# Patient Record
Sex: FEMALE | Race: BLACK OR AFRICAN AMERICAN | Employment: FULL TIME | URBAN - METROPOLITAN AREA
[De-identification: names, ages, dates, MRNs, and addresses within clinical notes are randomized per-mention and may not be internally consistent; named-entity substitution may affect disease eponyms.]

---

## 2024-09-24 ENCOUNTER — OFFICE VISIT (OUTPATIENT)
Age: 44
End: 2024-09-24
Payer: COMMERCIAL

## 2024-09-24 VITALS
OXYGEN SATURATION: 98 % | WEIGHT: 221 LBS | SYSTOLIC BLOOD PRESSURE: 123 MMHG | HEIGHT: 63 IN | RESPIRATION RATE: 18 BRPM | HEART RATE: 77 BPM | BODY MASS INDEX: 39.16 KG/M2 | DIASTOLIC BLOOD PRESSURE: 70 MMHG

## 2024-09-24 DIAGNOSIS — Z79.01 LONG TERM (CURRENT) USE OF ANTICOAGULANTS: Primary | ICD-10-CM

## 2024-09-24 DIAGNOSIS — F43.9 SITUATIONAL STRESS: ICD-10-CM

## 2024-09-24 DIAGNOSIS — F41.9 ANXIETY: ICD-10-CM

## 2024-09-24 DIAGNOSIS — R55 SYNCOPE, UNSPECIFIED SYNCOPE TYPE: ICD-10-CM

## 2024-09-24 LAB
POC INR: 2.1
PROTHROMBIN TIME, POC: 25.6

## 2024-09-24 PROCEDURE — 3078F DIAST BP <80 MM HG: CPT | Performed by: NURSE PRACTITIONER

## 2024-09-24 PROCEDURE — 3074F SYST BP LT 130 MM HG: CPT | Performed by: NURSE PRACTITIONER

## 2024-09-24 PROCEDURE — 85610 PROTHROMBIN TIME: CPT | Performed by: NURSE PRACTITIONER

## 2024-09-24 PROCEDURE — 99214 OFFICE O/P EST MOD 30 MIN: CPT | Performed by: NURSE PRACTITIONER

## 2024-09-24 ASSESSMENT — PATIENT HEALTH QUESTIONNAIRE - PHQ9
9. THOUGHTS THAT YOU WOULD BE BETTER OFF DEAD, OR OF HURTING YOURSELF: NOT AT ALL
5. POOR APPETITE OR OVEREATING: SEVERAL DAYS
2. FEELING DOWN, DEPRESSED OR HOPELESS: NEARLY EVERY DAY
10. IF YOU CHECKED OFF ANY PROBLEMS, HOW DIFFICULT HAVE THESE PROBLEMS MADE IT FOR YOU TO DO YOUR WORK, TAKE CARE OF THINGS AT HOME, OR GET ALONG WITH OTHER PEOPLE: EXTREMELY DIFFICULT
SUM OF ALL RESPONSES TO PHQ QUESTIONS 1-9: 17
6. FEELING BAD ABOUT YOURSELF - OR THAT YOU ARE A FAILURE OR HAVE LET YOURSELF OR YOUR FAMILY DOWN: MORE THAN HALF THE DAYS
7. TROUBLE CONCENTRATING ON THINGS, SUCH AS READING THE NEWSPAPER OR WATCHING TELEVISION: MORE THAN HALF THE DAYS
1. LITTLE INTEREST OR PLEASURE IN DOING THINGS: NEARLY EVERY DAY
SUM OF ALL RESPONSES TO PHQ QUESTIONS 1-9: 17
SUM OF ALL RESPONSES TO PHQ9 QUESTIONS 1 & 2: 6
SUM OF ALL RESPONSES TO PHQ QUESTIONS 1-9: 17
SUM OF ALL RESPONSES TO PHQ QUESTIONS 1-9: 17
4. FEELING TIRED OR HAVING LITTLE ENERGY: NEARLY EVERY DAY
3. TROUBLE FALLING OR STAYING ASLEEP: NEARLY EVERY DAY
8. MOVING OR SPEAKING SO SLOWLY THAT OTHER PEOPLE COULD HAVE NOTICED. OR THE OPPOSITE, BEING SO FIGETY OR RESTLESS THAT YOU HAVE BEEN MOVING AROUND A LOT MORE THAN USUAL: NOT AT ALL

## 2024-09-24 ASSESSMENT — ENCOUNTER SYMPTOMS
GASTROINTESTINAL NEGATIVE: 1
RESPIRATORY NEGATIVE: 1

## 2024-09-25 ENCOUNTER — OFFICE VISIT (OUTPATIENT)
Age: 44
End: 2024-09-25
Payer: COMMERCIAL

## 2024-09-25 VITALS
WEIGHT: 225 LBS | BODY MASS INDEX: 39.87 KG/M2 | HEIGHT: 63 IN | OXYGEN SATURATION: 98 % | RESPIRATION RATE: 20 BRPM | SYSTOLIC BLOOD PRESSURE: 130 MMHG | TEMPERATURE: 98.3 F | DIASTOLIC BLOOD PRESSURE: 86 MMHG | HEART RATE: 94 BPM

## 2024-09-25 DIAGNOSIS — F41.9 ANXIETY: ICD-10-CM

## 2024-09-25 DIAGNOSIS — R07.89 CHEST DISCOMFORT: ICD-10-CM

## 2024-09-25 DIAGNOSIS — R55 SYNCOPE, UNSPECIFIED SYNCOPE TYPE: Primary | ICD-10-CM

## 2024-09-25 PROCEDURE — 99204 OFFICE O/P NEW MOD 45 MIN: CPT | Performed by: INTERNAL MEDICINE

## 2024-09-25 PROCEDURE — 3075F SYST BP GE 130 - 139MM HG: CPT | Performed by: INTERNAL MEDICINE

## 2024-09-25 PROCEDURE — 3079F DIAST BP 80-89 MM HG: CPT | Performed by: INTERNAL MEDICINE

## 2024-09-26 ENCOUNTER — APPOINTMENT (OUTPATIENT)
Facility: HOSPITAL | Age: 44
End: 2024-09-26
Payer: COMMERCIAL

## 2024-09-26 ENCOUNTER — HOSPITAL ENCOUNTER (EMERGENCY)
Facility: HOSPITAL | Age: 44
Discharge: HOME | End: 2024-09-26
Attending: EMERGENCY MEDICINE
Payer: COMMERCIAL

## 2024-09-26 VITALS
HEART RATE: 67 BPM | TEMPERATURE: 99.2 F | DIASTOLIC BLOOD PRESSURE: 82 MMHG | HEIGHT: 63 IN | OXYGEN SATURATION: 100 % | SYSTOLIC BLOOD PRESSURE: 144 MMHG | RESPIRATION RATE: 15 BRPM | BODY MASS INDEX: 39.16 KG/M2 | WEIGHT: 221 LBS

## 2024-09-26 DIAGNOSIS — R07.9 CHEST PAIN, UNSPECIFIED TYPE: Primary | ICD-10-CM

## 2024-09-26 DIAGNOSIS — R51.9 NONINTRACTABLE HEADACHE, UNSPECIFIED CHRONICITY PATTERN, UNSPECIFIED HEADACHE TYPE: ICD-10-CM

## 2024-09-26 LAB
ALBUMIN SERPL-MCNC: 3.8 G/DL (ref 3.5–5)
ALBUMIN/GLOB SERPL: 1.1 (ref 1.1–2.2)
ALP SERPL-CCNC: 82 U/L (ref 45–117)
ALT SERPL-CCNC: 54 U/L (ref 12–78)
ANION GAP SERPL CALC-SCNC: 9 MMOL/L (ref 2–12)
AST SERPL-CCNC: 16 U/L (ref 15–37)
BASOPHILS # BLD: 0.1 K/UL (ref 0–0.1)
BASOPHILS NFR BLD: 1 % (ref 0–1)
BILIRUB SERPL-MCNC: 0.3 MG/DL (ref 0.2–1)
BUN SERPL-MCNC: 10 MG/DL (ref 6–20)
BUN/CREAT SERPL: 10 (ref 12–20)
CALCIUM SERPL-MCNC: 9 MG/DL (ref 8.5–10.1)
CHLORIDE SERPL-SCNC: 102 MMOL/L (ref 97–108)
CO2 SERPL-SCNC: 28 MMOL/L (ref 21–32)
CREAT SERPL-MCNC: 1 MG/DL (ref 0.55–1.02)
DIFFERENTIAL METHOD BLD: ABNORMAL
EOSINOPHIL # BLD: 0.3 K/UL (ref 0–0.4)
EOSINOPHIL NFR BLD: 6 % (ref 0–7)
ERYTHROCYTE [DISTWIDTH] IN BLOOD BY AUTOMATED COUNT: 15.1 % (ref 11.5–14.5)
GLOBULIN SER CALC-MCNC: 3.6 G/DL (ref 2–4)
GLUCOSE SERPL-MCNC: 84 MG/DL (ref 65–100)
HCT VFR BLD AUTO: 39.8 % (ref 35–47)
HGB BLD-MCNC: 12.7 G/DL (ref 11.5–16)
IMM GRANULOCYTES # BLD AUTO: 0 K/UL (ref 0–0.04)
IMM GRANULOCYTES NFR BLD AUTO: 0 % (ref 0–0.5)
LYMPHOCYTES # BLD: 2.7 K/UL (ref 0.8–3.5)
LYMPHOCYTES NFR BLD: 47 % (ref 12–49)
MCH RBC QN AUTO: 24.5 PG (ref 26–34)
MCHC RBC AUTO-ENTMCNC: 31.9 G/DL (ref 30–36.5)
MCV RBC AUTO: 76.8 FL (ref 80–99)
MONOCYTES # BLD: 0.5 K/UL (ref 0–1)
MONOCYTES NFR BLD: 8 % (ref 5–13)
NEUTS SEG # BLD: 2.3 K/UL (ref 1.8–8)
NEUTS SEG NFR BLD: 38 % (ref 32–75)
NRBC # BLD: 0 K/UL (ref 0–0.01)
NRBC BLD-RTO: 0 PER 100 WBC
PLATELET # BLD AUTO: 290 K/UL (ref 150–400)
PMV BLD AUTO: 12.5 FL (ref 8.9–12.9)
POTASSIUM SERPL-SCNC: 3.6 MMOL/L (ref 3.5–5.1)
PROT SERPL-MCNC: 7.4 G/DL (ref 6.4–8.2)
RBC # BLD AUTO: 5.18 M/UL (ref 3.8–5.2)
SODIUM SERPL-SCNC: 139 MMOL/L (ref 136–145)
TROPONIN I SERPL HS-MCNC: <4 NG/L (ref 0–51)
WBC # BLD AUTO: 5.9 K/UL (ref 3.6–11)

## 2024-09-26 PROCEDURE — 93005 ELECTROCARDIOGRAM TRACING: CPT | Performed by: EMERGENCY MEDICINE

## 2024-09-26 PROCEDURE — 80053 COMPREHEN METABOLIC PANEL: CPT

## 2024-09-26 PROCEDURE — 99285 EMERGENCY DEPT VISIT HI MDM: CPT

## 2024-09-26 PROCEDURE — 85025 COMPLETE CBC W/AUTO DIFF WBC: CPT

## 2024-09-26 PROCEDURE — 71045 X-RAY EXAM CHEST 1 VIEW: CPT

## 2024-09-26 PROCEDURE — 6360000002 HC RX W HCPCS: Performed by: EMERGENCY MEDICINE

## 2024-09-26 PROCEDURE — 84484 ASSAY OF TROPONIN QUANT: CPT

## 2024-09-26 PROCEDURE — 36415 COLL VENOUS BLD VENIPUNCTURE: CPT

## 2024-09-26 PROCEDURE — 96374 THER/PROPH/DIAG INJ IV PUSH: CPT

## 2024-09-26 RX ORDER — KETOROLAC TROMETHAMINE 15 MG/ML
15 INJECTION, SOLUTION INTRAMUSCULAR; INTRAVENOUS
Status: COMPLETED | OUTPATIENT
Start: 2024-09-26 | End: 2024-09-26

## 2024-09-26 RX ADMIN — KETOROLAC TROMETHAMINE 15 MG: 15 INJECTION, SOLUTION INTRAMUSCULAR; INTRAVENOUS at 20:23

## 2024-09-26 ASSESSMENT — PAIN SCALES - GENERAL
PAINLEVEL_OUTOF10: 7
PAINLEVEL_OUTOF10: 6

## 2024-09-26 ASSESSMENT — LIFESTYLE VARIABLES
HOW OFTEN DO YOU HAVE A DRINK CONTAINING ALCOHOL: MONTHLY OR LESS
HOW MANY STANDARD DRINKS CONTAINING ALCOHOL DO YOU HAVE ON A TYPICAL DAY: 1 OR 2

## 2024-09-26 ASSESSMENT — PAIN DESCRIPTION - DESCRIPTORS: DESCRIPTORS: ACHING

## 2024-09-26 ASSESSMENT — PAIN DESCRIPTION - LOCATION: LOCATION: HEAD

## 2024-09-26 ASSESSMENT — PAIN - FUNCTIONAL ASSESSMENT: PAIN_FUNCTIONAL_ASSESSMENT: 0-10

## 2024-09-27 LAB
EKG ATRIAL RATE: 69 BPM
EKG DIAGNOSIS: NORMAL
EKG P AXIS: 52 DEGREES
EKG P-R INTERVAL: 102 MS
EKG Q-T INTERVAL: 414 MS
EKG QRS DURATION: 78 MS
EKG QTC CALCULATION (BAZETT): 443 MS
EKG R AXIS: 30 DEGREES
EKG T AXIS: 51 DEGREES
EKG VENTRICULAR RATE: 69 BPM

## 2024-09-27 ASSESSMENT — HEART SCORE: ECG: NORMAL

## 2024-10-04 ENCOUNTER — HOSPITAL ENCOUNTER (EMERGENCY)
Facility: HOSPITAL | Age: 44
Discharge: HOME | End: 2024-10-04
Attending: FAMILY MEDICINE | Admitting: FAMILY MEDICINE
Payer: COMMERCIAL

## 2024-10-04 VITALS
DIASTOLIC BLOOD PRESSURE: 82 MMHG | RESPIRATION RATE: 19 BRPM | TEMPERATURE: 99.1 F | SYSTOLIC BLOOD PRESSURE: 125 MMHG | HEIGHT: 63 IN | WEIGHT: 218 LBS | OXYGEN SATURATION: 99 % | BODY MASS INDEX: 38.62 KG/M2 | HEART RATE: 80 BPM

## 2024-10-04 DIAGNOSIS — G43.909 MIGRAINE WITHOUT STATUS MIGRAINOSUS, NOT INTRACTABLE, UNSPECIFIED MIGRAINE TYPE: ICD-10-CM

## 2024-10-04 DIAGNOSIS — R55 SYNCOPE AND COLLAPSE: Primary | ICD-10-CM

## 2024-10-04 LAB
ALBUMIN SERPL-MCNC: 3.7 G/DL (ref 3.5–5)
ALBUMIN/GLOB SERPL: 1 (ref 1.1–2.2)
ALP SERPL-CCNC: 89 U/L (ref 45–117)
ALT SERPL-CCNC: 26 U/L (ref 12–78)
ANION GAP SERPL CALC-SCNC: 6 MMOL/L (ref 2–12)
APPEARANCE UR: CLEAR
AST SERPL-CCNC: 16 U/L (ref 15–37)
BACTERIA URNS QL MICRO: NEGATIVE /HPF
BASOPHILS # BLD: 0.1 K/UL (ref 0–0.1)
BASOPHILS NFR BLD: 1 % (ref 0–1)
BILIRUB SERPL-MCNC: 0.3 MG/DL (ref 0.2–1)
BILIRUB UR QL: NEGATIVE
BUN SERPL-MCNC: 11 MG/DL (ref 6–20)
BUN/CREAT SERPL: 10 (ref 12–20)
CALCIUM SERPL-MCNC: 9.1 MG/DL (ref 8.5–10.1)
CHLORIDE SERPL-SCNC: 103 MMOL/L (ref 97–108)
CO2 SERPL-SCNC: 33 MMOL/L (ref 21–32)
COLOR UR: ABNORMAL
CREAT SERPL-MCNC: 1.08 MG/DL (ref 0.55–1.02)
DIFFERENTIAL METHOD BLD: ABNORMAL
EOSINOPHIL # BLD: 0.6 K/UL (ref 0–0.4)
EOSINOPHIL NFR BLD: 9 % (ref 0–7)
EPITH CASTS URNS QL MICRO: ABNORMAL /LPF
ERYTHROCYTE [DISTWIDTH] IN BLOOD BY AUTOMATED COUNT: 15 % (ref 11.5–14.5)
GLOBULIN SER CALC-MCNC: 3.8 G/DL (ref 2–4)
GLUCOSE SERPL-MCNC: 102 MG/DL (ref 65–100)
GLUCOSE UR STRIP.AUTO-MCNC: NEGATIVE MG/DL
HCT VFR BLD AUTO: 41.2 % (ref 35–47)
HGB BLD-MCNC: 13.3 G/DL (ref 11.5–16)
HGB UR QL STRIP: ABNORMAL
IMM GRANULOCYTES # BLD AUTO: 0 K/UL (ref 0–0.04)
IMM GRANULOCYTES NFR BLD AUTO: 0 % (ref 0–0.5)
INR PPP: 1.8 (ref 0.9–1.1)
KETONES UR QL STRIP.AUTO: NEGATIVE MG/DL
LEUKOCYTE ESTERASE UR QL STRIP.AUTO: NEGATIVE
LYMPHOCYTES # BLD: 2.3 K/UL (ref 0.8–3.5)
LYMPHOCYTES NFR BLD: 34 % (ref 12–49)
MAGNESIUM SERPL-MCNC: 2.1 MG/DL (ref 1.6–2.4)
MCH RBC QN AUTO: 24.8 PG (ref 26–34)
MCHC RBC AUTO-ENTMCNC: 32.3 G/DL (ref 30–36.5)
MCV RBC AUTO: 76.7 FL (ref 80–99)
MONOCYTES # BLD: 0.6 K/UL (ref 0–1)
MONOCYTES NFR BLD: 9 % (ref 5–13)
NEUTS SEG # BLD: 3.2 K/UL (ref 1.8–8)
NEUTS SEG NFR BLD: 47 % (ref 32–75)
NITRITE UR QL STRIP.AUTO: NEGATIVE
NRBC # BLD: 0 K/UL (ref 0–0.01)
NRBC BLD-RTO: 0 PER 100 WBC
PH UR STRIP: 6.5 (ref 5–8)
PLATELET # BLD AUTO: 318 K/UL (ref 150–400)
PMV BLD AUTO: 11.7 FL (ref 8.9–12.9)
POTASSIUM SERPL-SCNC: 3.5 MMOL/L (ref 3.5–5.1)
PROT SERPL-MCNC: 7.5 G/DL (ref 6.4–8.2)
PROT UR STRIP-MCNC: NEGATIVE MG/DL
PROTHROMBIN TIME: 17.1 SEC (ref 9–11.1)
RBC # BLD AUTO: 5.37 M/UL (ref 3.8–5.2)
RBC #/AREA URNS HPF: ABNORMAL /HPF (ref 0–5)
SODIUM SERPL-SCNC: 142 MMOL/L (ref 136–145)
SP GR UR REFRACTOMETRY: 1.01 (ref 1–1.03)
URINE CULTURE IF INDICATED: ABNORMAL
UROBILINOGEN UR QL STRIP.AUTO: 0.2 EU/DL (ref 0.2–1)
WBC # BLD AUTO: 6.8 K/UL (ref 3.6–11)
WBC URNS QL MICRO: ABNORMAL /HPF (ref 0–4)

## 2024-10-04 PROCEDURE — 99284 EMERGENCY DEPT VISIT MOD MDM: CPT

## 2024-10-04 PROCEDURE — 96374 THER/PROPH/DIAG INJ IV PUSH: CPT

## 2024-10-04 PROCEDURE — 83735 ASSAY OF MAGNESIUM: CPT

## 2024-10-04 PROCEDURE — 36415 COLL VENOUS BLD VENIPUNCTURE: CPT

## 2024-10-04 PROCEDURE — 96375 TX/PRO/DX INJ NEW DRUG ADDON: CPT

## 2024-10-04 PROCEDURE — 85025 COMPLETE CBC W/AUTO DIFF WBC: CPT

## 2024-10-04 PROCEDURE — 96361 HYDRATE IV INFUSION ADD-ON: CPT

## 2024-10-04 PROCEDURE — 81001 URINALYSIS AUTO W/SCOPE: CPT

## 2024-10-04 PROCEDURE — 85610 PROTHROMBIN TIME: CPT

## 2024-10-04 PROCEDURE — 6360000002 HC RX W HCPCS: Performed by: FAMILY MEDICINE

## 2024-10-04 PROCEDURE — 2580000003 HC RX 258: Performed by: FAMILY MEDICINE

## 2024-10-04 PROCEDURE — 80053 COMPREHEN METABOLIC PANEL: CPT

## 2024-10-04 RX ORDER — PROCHLORPERAZINE EDISYLATE 5 MG/ML
10 INJECTION INTRAMUSCULAR; INTRAVENOUS ONCE
Status: COMPLETED | OUTPATIENT
Start: 2024-10-04 | End: 2024-10-04

## 2024-10-04 RX ORDER — 0.9 % SODIUM CHLORIDE 0.9 %
1000 INTRAVENOUS SOLUTION INTRAVENOUS ONCE
Status: COMPLETED | OUTPATIENT
Start: 2024-10-04 | End: 2024-10-04

## 2024-10-04 RX ORDER — DIPHENHYDRAMINE HYDROCHLORIDE 50 MG/ML
25 INJECTION INTRAMUSCULAR; INTRAVENOUS
Status: COMPLETED | OUTPATIENT
Start: 2024-10-04 | End: 2024-10-04

## 2024-10-04 RX ORDER — KETOROLAC TROMETHAMINE 15 MG/ML
15 INJECTION, SOLUTION INTRAMUSCULAR; INTRAVENOUS
Status: COMPLETED | OUTPATIENT
Start: 2024-10-04 | End: 2024-10-04

## 2024-10-04 RX ORDER — PROPRANOLOL HCL 60 MG
60 CAPSULE, EXTENDED RELEASE 24HR ORAL DAILY
COMMUNITY
Start: 2024-10-02 | End: 2024-11-01

## 2024-10-04 RX ADMIN — PROCHLORPERAZINE EDISYLATE 10 MG: 5 INJECTION INTRAMUSCULAR; INTRAVENOUS at 21:14

## 2024-10-04 RX ADMIN — KETOROLAC TROMETHAMINE 15 MG: 15 INJECTION, SOLUTION INTRAMUSCULAR; INTRAVENOUS at 21:12

## 2024-10-04 RX ADMIN — SODIUM CHLORIDE 1000 ML: 9 INJECTION, SOLUTION INTRAVENOUS at 21:11

## 2024-10-04 RX ADMIN — DIPHENHYDRAMINE HYDROCHLORIDE 25 MG: 50 INJECTION INTRAMUSCULAR; INTRAVENOUS at 21:14

## 2024-10-04 ASSESSMENT — PAIN SCALES - GENERAL
PAINLEVEL_OUTOF10: 0

## 2024-10-04 ASSESSMENT — PAIN - FUNCTIONAL ASSESSMENT: PAIN_FUNCTIONAL_ASSESSMENT: NONE - DENIES PAIN

## 2024-10-05 NOTE — ED PROVIDER NOTES
[facility] EMERGENCY DEP  EMERGENCY DEPARTMENT ENCOUNTER       Pt Name: Patricia Babb  MRN: [id number]  Birthdate 1980  Date of evaluation: 10/4/2024  Provider: Cici Araiza MD   PCP: Gloria Alex APRN - NP  Note Started: 8:31 PM EDT 10/4/24     CHIEF COMPLAINT       Chief Complaint   Patient presents with    Loss of Consciousness    Headache        HISTORY OF PRESENT ILLNESS: 1 or more elements      History From: Patient and Patient's   HPI Limitations: None     Patricia Babb is a 44 y.o. female who was brought to the ED by her  because of a syncopal episode that occurred about an hour pta. She reports that she was admitted to [facility] 9/29 to 10/2, after being seen in [facility] 9/18/24 with recurrent syncope. This second time she was seen, they told her that she was having syncope from her headaches. They changed her metoprolol to propranolol. She has had an event monitor read by Dr. Thayer 9/25, and no cardiac arrhythmias were seen when she fainted. Tonight, she fainted across her bed, and when she came to she said that she has her usual left sided migraine, throbbing in nature.     Nursing Notes were all reviewed and agreed with or any disagreements were addressed in the HPI.     REVIEW OF SYSTEMS      Review of Systems     Positives and Pertinent negatives as per HPI.    PAST HISTORY     Past Medical History:  Past Medical History:   Diagnosis Date    Asthma     2017 SUMMER LAST FLARE UP    Condyloma acuminatum     Deep vein thrombophlebitis of lower leg, left (HCC) 2016    LT LOWER LEG (2ND EPISODE)    Deep vein thrombosis (DVT) ([facility]) 10/14/2008    lower left leg-DURING PREGNANCY    GERD (gastroesophageal reflux disease)     H/O fibromyalgia     Hyperlipidemia     Hypertension 8/05    CONROLLED BY MEDS    Menarche age 16    Thyroid cyst     2 cysts on each lobe. Dr. Rivera    Thyroid disease 8/05    hyperthyroid-transient    Transient hypertension of pregnancy, antepartum     Vitamin D  THE LUNGS TWICE DAILY     busPIRone 5 MG tablet  Commonly known as: BUSPAR  Take 1 tablet by mouth 2 times daily as needed (anxiety)     butalbital-acetaminophen-caffeine -40 MG per tablet  Commonly known as: FIORICET, ESGIC  Take 1 tablet by mouth every 8 hours as needed for Headaches     cetirizine 10 MG tablet  Commonly known as: ZYRTEC     clonazePAM 0.5 MG tablet  Commonly known as: KlonoPIN  Take 1 tablet by mouth nightly as needed for Anxiety for up to 30 days. Max Daily Amount: 0.5 mg     cyclobenzaprine 10 MG tablet  Commonly known as: FLEXERIL     dexlansoprazole 60 MG Cpdr delayed release capsule  Commonly known as: DEXILANT  TAKE 1 CAPSULE BY MOUTH DAILY     diclofenac sodium 1 % Gel  Commonly known as: VOLTAREN  APPLY TO THE AFFECTED AREA FOUR TIMES A DAY     escitalopram 10 MG tablet  Commonly known as: LEXAPRO  Take 1 tablet by mouth daily     ferrous sulfate 325 (65 Fe) MG EC tablet  Commonly known as: FE TABS 325     fluticasone 50 MCG/ACT nasal spray  Commonly known as: FLONASE  SHAKE LIQUID AND USE 2 SPRAYS IN EACH NOSTRIL DAILY     furosemide 20 MG tablet  Commonly known as: LASIX     hydroCHLOROthiazide 25 MG tablet  Commonly known as: HYDRODIURIL  TAKE 1 TABLET BY MOUTH EVERY DAY     metoclopramide 10 MG tablet  Commonly known as: Reglan  Take 1 tablet by mouth 3 times daily as needed (nausea with headache)     metoprolol succinate 50 MG extended release tablet  Commonly known as: TOPROL XL  TAKE 1 TABLET BY MOUTH DAILY     montelukast 10 MG tablet  Commonly known as: SINGULAIR     ondansetron 8 MG Tbdp disintegrating tablet  Commonly known as: ZOFRAN-ODT     potassium chloride 20 MEQ extended release tablet  Commonly known as: KLOR-CON M  Take 1 tablet by mouth 2 times daily     pregabalin 150 MG capsule  Commonly known as: LYRICA     propranolol 60 MG extended release capsule  Commonly known as: INDERAL LA     triamcinolone 0.025 % cream  Commonly known as: KENALOG     vitamin D3 25 MCG

## 2024-10-05 NOTE — ED TRIAGE NOTES
Pt reports that she was standing up doing her hair and passed out\" 1.5 hours ago. Pt reports mild headache.  states pt has been passing out  and was seen and admitted at [facility] on Sunday and diagnosed with a headache that causes her symptoms.

## 2024-10-08 ENCOUNTER — OFFICE VISIT (OUTPATIENT)
Age: 44
End: 2024-10-08
Payer: COMMERCIAL

## 2024-10-08 VITALS
OXYGEN SATURATION: 97 % | TEMPERATURE: 97 F | SYSTOLIC BLOOD PRESSURE: 112 MMHG | HEART RATE: 83 BPM | WEIGHT: 218 LBS | BODY MASS INDEX: 38.62 KG/M2 | HEIGHT: 63 IN | DIASTOLIC BLOOD PRESSURE: 79 MMHG | RESPIRATION RATE: 18 BRPM

## 2024-10-08 DIAGNOSIS — Z87.898 HISTORY OF HEADACHE: ICD-10-CM

## 2024-10-08 DIAGNOSIS — Z79.01 LONG TERM (CURRENT) USE OF ANTICOAGULANTS: Primary | ICD-10-CM

## 2024-10-08 DIAGNOSIS — R55 SYNCOPE, UNSPECIFIED SYNCOPE TYPE: ICD-10-CM

## 2024-10-08 DIAGNOSIS — F41.9 ANXIETY: ICD-10-CM

## 2024-10-08 DIAGNOSIS — Z79.01 ON WARFARIN THERAPY: ICD-10-CM

## 2024-10-08 DIAGNOSIS — Z86.718 HISTORY OF RECURRENT DEEP VEIN THROMBOSIS (DVT): ICD-10-CM

## 2024-10-08 LAB
POC INR: 1.7
PROTHROMBIN TIME, POC: 20.2

## 2024-10-08 PROCEDURE — 85610 PROTHROMBIN TIME: CPT | Performed by: NURSE PRACTITIONER

## 2024-10-08 PROCEDURE — 99214 OFFICE O/P EST MOD 30 MIN: CPT | Performed by: NURSE PRACTITIONER

## 2024-10-08 PROCEDURE — 3074F SYST BP LT 130 MM HG: CPT | Performed by: NURSE PRACTITIONER

## 2024-10-08 PROCEDURE — 3078F DIAST BP <80 MM HG: CPT | Performed by: NURSE PRACTITIONER

## 2024-10-08 RX ORDER — WARFARIN SODIUM 5 MG/1
TABLET ORAL
Qty: 90 TABLET | Refills: 1
Start: 2024-10-08

## 2024-10-08 ASSESSMENT — ENCOUNTER SYMPTOMS
RESPIRATORY NEGATIVE: 1
GASTROINTESTINAL NEGATIVE: 1

## 2024-10-08 NOTE — PROGRESS NOTES
Patricia Babb is a 44 y.o. female who presents today with c/o   Chief Complaint   Patient presents with    Follow-up     ER follow up--recently seen at [facility].  Diagnosed with migraines.  MRI head without any significant findings.  Needs INR checked.  Spouse wants [facility] paperwork completed to help care for patient           Assessment/ Plan:       ASSESSMENT AND PLAN    Anxiety  Continue lexapro  Cont buspar  Continue klonopin QHS PRN  Stop lorazapam  She stopped trazodone on her own  Local counselor RIO Fontanez contact information provided  Referral placed to psychiatry for recommendations--she has not done this yet    Chest pain  Cardiology note reviewed from 9/25/2024.  Atypical and likely not angina chest pain.  No coronary calcification on recent chest CT.  She did complete the Holter monitor.  Awaiting results.    Headaches   -Frontal with associated vision changes and posterior neck pain. Reports that headaches sometimes happen prior to her syncopal episodes but then seem to linger. She is using Fioricet frequently,.  Thought to be some overuse contributing to headache. Neuro imaging noted below.      Neurology recommended empiric low dose propranolol 60mg extended release and close follow up with neurology.  She tells me she has not made her neurology appointment.  Advised her to schedule the neurology appointment. There is no other plan for management other than the propranolol at this time so again I am not sure what the qualifying reason would be for the [facility] paperwork.    Recurrent syncope  -Neuro work up: CT head has been repetitively negative for acute process. 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 CTA head neck no acute process. Brain MRI w/o acute process.   -Cardiac work up: EKG has shown normal sinus rhythm with non specific ST changes, cardiac enzymes have been negative, with no other signs of ischemia. Infrequent PVCs noted on telemetry this admission. Pt wearing

## 2024-10-22 ENCOUNTER — TRANSCRIBE ORDERS (OUTPATIENT)
Facility: HOSPITAL | Age: 44
End: 2024-10-22

## 2024-10-22 DIAGNOSIS — Z12.31 SCREENING MAMMOGRAM FOR BREAST CANCER: Primary | ICD-10-CM

## 2024-10-22 RX ORDER — DEXLANSOPRAZOLE 60 MG/1
60 CAPSULE, DELAYED RELEASE ORAL DAILY
Qty: 90 CAPSULE | Refills: 0 | Status: SHIPPED | OUTPATIENT
Start: 2024-10-22

## 2024-10-31 ENCOUNTER — APPOINTMENT (OUTPATIENT)
Facility: HOSPITAL | Age: 44
End: 2024-10-31
Payer: COMMERCIAL

## 2024-10-31 ENCOUNTER — HOSPITAL ENCOUNTER (EMERGENCY)
Facility: HOSPITAL | Age: 44
Discharge: HOME | End: 2024-10-31
Attending: EMERGENCY MEDICINE
Payer: COMMERCIAL

## 2024-10-31 VITALS
RESPIRATION RATE: 14 BRPM | WEIGHT: 215 LBS | OXYGEN SATURATION: 100 % | HEIGHT: 63 IN | HEART RATE: 79 BPM | DIASTOLIC BLOOD PRESSURE: 89 MMHG | SYSTOLIC BLOOD PRESSURE: 141 MMHG | BODY MASS INDEX: 38.09 KG/M2

## 2024-10-31 DIAGNOSIS — M54.32 SCIATICA OF LEFT SIDE: ICD-10-CM

## 2024-10-31 DIAGNOSIS — R20.0 NUMBNESS AND TINGLING OF LEFT LEG: Primary | ICD-10-CM

## 2024-10-31 DIAGNOSIS — R20.2 NUMBNESS AND TINGLING OF LEFT LEG: Primary | ICD-10-CM

## 2024-10-31 LAB — ECHO BSA: 2.08 M2

## 2024-10-31 PROCEDURE — 93971 EXTREMITY STUDY: CPT

## 2024-10-31 PROCEDURE — 99284 EMERGENCY DEPT VISIT MOD MDM: CPT

## 2024-10-31 RX ORDER — METHYLPREDNISOLONE 4 MG/1
TABLET ORAL
Qty: 1 KIT | Refills: 0 | Status: SHIPPED | OUTPATIENT
Start: 2024-10-31 | End: 2024-11-06

## 2024-10-31 ASSESSMENT — PAIN SCALES - GENERAL: PAINLEVEL_OUTOF10: 0

## 2024-10-31 ASSESSMENT — PAIN - FUNCTIONAL ASSESSMENT: PAIN_FUNCTIONAL_ASSESSMENT: 0-10

## 2024-10-31 NOTE — ED TRIAGE NOTES
LLE tingling starting last night approx 2000, ambulated in , no injury , hx of sciatica nerve with same presentation .

## 2024-10-31 NOTE — ED PROVIDER NOTES
Medication List as of 10/31/2024  3:32 PM        CONTINUE these medications which have NOT CHANGED    Details   dexlansoprazole (DEXILANT) 60 MG CPDR delayed release capsule TAKE 1 CAPSULE BY MOUTH DAILY, Disp-90 capsule, R-0Normal      !! warfarin (COUMADIN) 5 MG tablet TAKE 7.5MG BY MOUTH EVERY DAY EXCEPT SUNDAY TAKE 5 MG BY MOUTH, Disp-90 tablet, R-1NO PRINT      propranolol (INDERAL LA) 60 MG extended release capsule Take 1 capsule by mouth dailyHistorical Med      escitalopram (LEXAPRO) 10 MG tablet Take 1 tablet by mouth daily, Disp-90 tablet, R-1Normal      clonazePAM (KLONOPIN) 0.5 MG tablet Take 1 tablet by mouth nightly as needed for Anxiety for up to 30 days. Max Daily Amount: 0.5 mg, Disp-30 tablet, R-0Normal      metoclopramide (REGLAN) 10 MG tablet Take 1 tablet by mouth 3 times daily as needed (nausea with headache), Disp-20 tablet, R-0Normal      butalbital-acetaminophen-caffeine (FIORICET, ESGIC) -40 MG per tablet Take 1 tablet by mouth every 8 hours as needed for Headaches, Disp-180 tablet, R-3Normal      hydroCHLOROthiazide (HYDRODIURIL) 25 MG tablet TAKE 1 TABLET BY MOUTH EVERY DAY, Disp-90 tablet, R-1Normal      metoprolol succinate (TOPROL XL) 50 MG extended release tablet TAKE 1 TABLET BY MOUTH DAILY, Disp-90 tablet, R-1Normal      potassium chloride (KLOR-CON M) 20 MEQ extended release tablet Take 1 tablet by mouth 2 times daily, Disp-180 tablet, R-1Normal      budesonide-formoterol (SYMBICORT) 160-4.5 MCG/ACT AERO INHALE 2 PUFFS INTO THE LUNGS TWICE DAILY, Disp-10.2 g, R-3Normal      !! warfarin (COUMADIN) 1 MG tablet 7.5 mg every day except Sunday take 5 mg, Disp-90 tablet, R-1NO PRINT      albuterol (PROVENTIL) (2.5 MG/3ML) 0.083% nebulizer solution INHALE THE CONTENTS OF 1 VIAL BY MOUTH VIA NEBULIZER EVERY 4 TO 6 HOURS AS NEEDED, Disp-120 each, R-0Normal      fluticasone (FLONASE) 50 MCG/ACT nasal spray SHAKE LIQUID AND USE 2 SPRAYS IN EACH NOSTRIL DAILY, Disp-48 g, R-0**Patient  tablet  Commonly known as: FE TABS 325     fluticasone 50 MCG/ACT nasal spray  Commonly known as: FLONASE  SHAKE LIQUID AND USE 2 SPRAYS IN EACH NOSTRIL DAILY     furosemide 20 MG tablet  Commonly known as: LASIX     hydroCHLOROthiazide 25 MG tablet  Commonly known as: HYDRODIURIL  TAKE 1 TABLET BY MOUTH EVERY DAY     metoclopramide 10 MG tablet  Commonly known as: Reglan  Take 1 tablet by mouth 3 times daily as needed (nausea with headache)     metoprolol succinate 50 MG extended release tablet  Commonly known as: TOPROL XL  TAKE 1 TABLET BY MOUTH DAILY     montelukast 10 MG tablet  Commonly known as: SINGULAIR     ondansetron 8 MG Tbdp disintegrating tablet  Commonly known as: ZOFRAN-ODT     potassium chloride 20 MEQ extended release tablet  Commonly known as: KLOR-CON M  Take 1 tablet by mouth 2 times daily     pregabalin 150 MG capsule  Commonly known as: LYRICA     propranolol 60 MG extended release capsule  Commonly known as: INDERAL LA     triamcinolone 0.025 % cream  Commonly known as: KENALOG     vitamin D3 25 MCG (1000 UT) Tabs tablet  Commonly known as: CHOLECALCIFEROL  TAKE 1 TABLET BY MOUTH DAILY     * warfarin 1 MG tablet  Commonly known as: COUMADIN  7.5 mg every day except Sunday take 5 mg     * warfarin 5 MG tablet  Commonly known as: COUMADIN  TAKE 7.5MG BY MOUTH EVERY DAY EXCEPT SUNDAY TAKE 5 MG BY MOUTH           * This list has 4 medication(s) that are the same as other medications prescribed for you. Read the directions carefully, and ask your doctor or other care provider to review them with you.                   Where to Get Your Medications        [id number] - [place], [place] - [address] - P [phone] - F [phone]  [address] [address]      Phone: [phone]   methylPREDNISolone 4 MG tablet           DISCONTINUED MEDICATIONS:  Discharge Medication List as of 10/31/2024  3:32 PM          I am the Primary Clinician

## 2024-11-06 RX ORDER — BUSPIRONE HYDROCHLORIDE 5 MG/1
5 TABLET ORAL 2 TIMES DAILY PRN
Qty: 10 TABLET | Refills: 0 | Status: SHIPPED | OUTPATIENT
Start: 2024-11-06 | End: 2025-03-06

## 2024-11-06 RX ORDER — BUSPIRONE HYDROCHLORIDE 5 MG/1
5 TABLET ORAL 2 TIMES DAILY PRN
Qty: 60 TABLET | Refills: 3 | OUTPATIENT
Start: 2024-11-06

## 2024-11-08 ENCOUNTER — ANTI-COAG VISIT (OUTPATIENT)
Age: 44
End: 2024-11-08

## 2024-11-08 ENCOUNTER — OFFICE VISIT (OUTPATIENT)
Age: 44
End: 2024-11-08
Payer: COMMERCIAL

## 2024-11-08 VITALS
TEMPERATURE: 97.7 F | HEART RATE: 91 BPM | BODY MASS INDEX: 38.2 KG/M2 | WEIGHT: 215.6 LBS | RESPIRATION RATE: 16 BRPM | DIASTOLIC BLOOD PRESSURE: 79 MMHG | OXYGEN SATURATION: 100 % | SYSTOLIC BLOOD PRESSURE: 119 MMHG | HEIGHT: 63 IN

## 2024-11-08 DIAGNOSIS — Z79.01 ON WARFARIN THERAPY: Primary | ICD-10-CM

## 2024-11-08 DIAGNOSIS — R20.2 TINGLING IN EXTREMITIES: ICD-10-CM

## 2024-11-08 DIAGNOSIS — Z79.01 LONG TERM CURRENT USE OF ANTICOAGULANT THERAPY: ICD-10-CM

## 2024-11-08 DIAGNOSIS — G43.E09 CHRONIC MIGRAINE WITH AURA WITHOUT STATUS MIGRAINOSUS, NOT INTRACTABLE: ICD-10-CM

## 2024-11-08 LAB
ALBUMIN SERPL-MCNC: 3.5 G/DL (ref 3.5–5)
ALBUMIN/GLOB SERPL: 1.1 (ref 1.1–2.2)
ALP SERPL-CCNC: 58 U/L (ref 45–117)
ALT SERPL-CCNC: 16 U/L (ref 12–78)
ANION GAP SERPL CALC-SCNC: 5 MMOL/L (ref 2–12)
AST SERPL-CCNC: 5 U/L (ref 15–37)
BILIRUB SERPL-MCNC: 0.8 MG/DL (ref 0.2–1)
BUN SERPL-MCNC: 11 MG/DL (ref 6–20)
BUN/CREAT SERPL: 11 (ref 12–20)
CALCIUM SERPL-MCNC: 9.6 MG/DL (ref 8.5–10.1)
CHLORIDE SERPL-SCNC: 108 MMOL/L (ref 97–108)
CO2 SERPL-SCNC: 30 MMOL/L (ref 21–32)
COMMENT:: NORMAL
CREAT SERPL-MCNC: 0.97 MG/DL (ref 0.55–1.02)
FOLATE SERPL-MCNC: 5.2 NG/ML (ref 5–21)
GLOBULIN SER CALC-MCNC: 3.3 G/DL (ref 2–4)
GLUCOSE SERPL-MCNC: 90 MG/DL (ref 65–100)
POC INR: 1.2
POTASSIUM SERPL-SCNC: 4 MMOL/L (ref 3.5–5.1)
PROT SERPL-MCNC: 6.8 G/DL (ref 6.4–8.2)
PROTHROMBIN TIME, POC: 14.6
SODIUM SERPL-SCNC: 143 MMOL/L (ref 136–145)
SPECIMEN HOLD: NORMAL
TSH SERPL DL<=0.05 MIU/L-ACNC: 0.47 UIU/ML (ref 0.36–3.74)
VIT B12 SERPL-MCNC: 279 PG/ML (ref 193–986)

## 2024-11-08 PROCEDURE — 85610 PROTHROMBIN TIME: CPT | Performed by: STUDENT IN AN ORGANIZED HEALTH CARE EDUCATION/TRAINING PROGRAM

## 2024-11-08 ASSESSMENT — PATIENT HEALTH QUESTIONNAIRE - PHQ9: DEPRESSION UNABLE TO ASSESS: PT REFUSES

## 2024-11-08 NOTE — PROGRESS NOTES
Subjective:     Chief Complaint   Patient presents with    Coagulation Disorder       Patricia Babb is a 44 y.o. female who presents today for follow up     HPI    Hx recurrent DVT:  Has been anticoagulated on Coumadin for history of recurrent DVT.  Last INR 1.7, she has been taking Coumadin 7.5 mg except on Sunday she takes 5 mg.  States she had workup for recurrent DVT and was told that she had protein C deficiency however was on Coumadin during testing.  Initial DVT in the setting of pregnancy   Second DVT was in 2016  No diagnosis of antiphospholipid syndrome.  She has missed 2 doses of her Coumadin this week.    Since last visit in our office she was seen in the emergency department on 11/31 for numbness and tingling in the left leg.  DVT ultrasound was completed and negative.  She was given a Medrol Dosepak and discharged.  She has a history of sciatica and has a prescription for Lyrica.  States she has not been taking the Lyrica.  States she will have intermittent sensation of tingling in the bilateral feet, this has been chronic and ongoing.  No associated back pain.  No positional component.    She is also in the process of completing a continued workup for complex migraine with neurology, would like a referral to neurology outside [facility].  Has a history of recurrent syncope as well, unclear if this is related to her migraine syndrome.  She has been seen by neurology and cardiology for this.  Overall so far workup has been unrevealing.  No recent syncopal episode.  She is anticoagulated.  She is also being followed by the neurology remote monitoring program through [facility].  She is currently on propranolol for her migraines although she feels she continues to have breakthrough headaches.    Patient Active Problem List   Diagnosis    Hives of unknown origin    BMI 45.0-49.9, adult    Hyperuricemia    Essential hypertension    GERD (gastroesophageal reflux disease)    Peptic ulcer disease    Complex ovarian cyst

## 2024-11-08 NOTE — PROGRESS NOTES
Chief Complaint   Patient presents with    Coagulation Disorder       Vitals:    11/08/24 0953   BP: 119/79   Pulse: 91   Resp: 16   Temp: 97.7 °F (36.5 °C)   SpO2: 100%   \"Have you been to the ER, urgent care clinic since your last visit?  Hospitalized since your last visit?\"    NO    “Have you seen or consulted any other health care providers outside our system since your last visit?”    NO    Have you had a mammogram?”   NO    No breast cancer screening on file

## 2024-12-04 ENCOUNTER — OFFICE VISIT (OUTPATIENT)
Age: 44
End: 2024-12-04
Payer: COMMERCIAL

## 2024-12-04 VITALS
SYSTOLIC BLOOD PRESSURE: 109 MMHG | BODY MASS INDEX: 38.71 KG/M2 | WEIGHT: 218.5 LBS | OXYGEN SATURATION: 98 % | HEIGHT: 63 IN | RESPIRATION RATE: 18 BRPM | HEART RATE: 92 BPM | DIASTOLIC BLOOD PRESSURE: 76 MMHG | TEMPERATURE: 97.4 F

## 2024-12-04 DIAGNOSIS — Z79.01 LONG TERM CURRENT USE OF ANTICOAGULANT THERAPY: Primary | ICD-10-CM

## 2024-12-04 DIAGNOSIS — I82.409 RECURRENT ACUTE DEEP VEIN THROMBOSIS (DVT) OF LOWER EXTREMITY, UNSPECIFIED LATERALITY (HCC): ICD-10-CM

## 2024-12-04 LAB
POC INR: 1.9
PROTHROMBIN TIME, POC: 23.4

## 2024-12-04 PROCEDURE — 85610 PROTHROMBIN TIME: CPT | Performed by: STUDENT IN AN ORGANIZED HEALTH CARE EDUCATION/TRAINING PROGRAM

## 2024-12-04 PROCEDURE — 3074F SYST BP LT 130 MM HG: CPT | Performed by: STUDENT IN AN ORGANIZED HEALTH CARE EDUCATION/TRAINING PROGRAM

## 2024-12-04 PROCEDURE — 3078F DIAST BP <80 MM HG: CPT | Performed by: STUDENT IN AN ORGANIZED HEALTH CARE EDUCATION/TRAINING PROGRAM

## 2024-12-04 PROCEDURE — 99214 OFFICE O/P EST MOD 30 MIN: CPT | Performed by: STUDENT IN AN ORGANIZED HEALTH CARE EDUCATION/TRAINING PROGRAM

## 2024-12-04 ASSESSMENT — PATIENT HEALTH QUESTIONNAIRE - PHQ9
SUM OF ALL RESPONSES TO PHQ QUESTIONS 1-9: 0
SUM OF ALL RESPONSES TO PHQ9 QUESTIONS 1 & 2: 0
SUM OF ALL RESPONSES TO PHQ QUESTIONS 1-9: 0
SUM OF ALL RESPONSES TO PHQ QUESTIONS 1-9: 0
2. FEELING DOWN, DEPRESSED OR HOPELESS: NOT AT ALL
SUM OF ALL RESPONSES TO PHQ QUESTIONS 1-9: 0
1. LITTLE INTEREST OR PLEASURE IN DOING THINGS: NOT AT ALL

## 2024-12-04 NOTE — PROGRESS NOTES
\"Have you been to the ER, urgent care clinic since your last visit?  Hospitalized since your last visit?\"    NO    “Have you seen or consulted any other health care providers outside our system since your last visit?”    NO    Have you had a mammogram?”   NO    No breast cancer screening on file            
(Temporal)   Resp 18   Ht 1.6 m (5' 3\")   Wt 99.1 kg (218 lb 8 oz)   SpO2 98%   BMI 38.71 kg/m²   Body mass index is 38.71 kg/m².    Physical Exam   General: Alert and oriented. No acute distress.  Well nourished.  HEENT :  Eyes: Sclera white, conjunctiva clear.   Mouth: Pharynx non-erythematous, without exudate   Neck: Supple with FROM. No thyroid-megaly  Lungs: no increased wob, All lobes clear to auscultation bilaterally   Heart :RRR, no murmur   Extremities: Non-edematous  Abdomen: Soft and non-distended. Bowel sounds active. No tenderness to palpation, no masses.  Neuro: Cranial nerves grossly normal.  Mental status: Cognition, concentration, memory, and speech intact.   Psych: Mood and thought content appropriate for situation. Dressed appropriately and with good hygiene.  Skin: Warm, dry, and intact. No lesions or discoloration visible.      Results for orders placed or performed in visit on 12/04/24   AMB POC PT/INR   Result Value Ref Range    Prothrombin time, POC 23.4     POC INR 1.9          Assessment/ Plan:     Patricia was seen today for coagulation disorder.    Diagnoses and all orders for this visit:    Long term current use of anticoagulant therapy  Recurrent acute deep vein thrombosis (DVT) of lower extremity, unspecified laterality (HCC)  Reports history of recurrent DVT previously anticoagulated on Coumadin however has not had recent INR in range.  She is asymptomatic.  Discussed switching to Eliquis.  Risks and benefits discussed.  INR today remains less than 2.  Discussed holding her Coumadin additional day then starting 5 mg Eliquis twice daily.  Warning signs return precautions discussed.  Given recurrent DVT will continue with indefinite anticoagulation.  -     AMB POC PT/INR  -     apixaban (ELIQUIS) 5 MG TABS tablet; Take 1 tablet by mouth 2 times daily             Verbal and written instructions (see AVS) provided.  Patient expresses understanding of diagnosis and treatment

## 2024-12-11 RX ORDER — ALBUTEROL SULFATE 90 UG/1
INHALANT RESPIRATORY (INHALATION)
Qty: 8.5 G | Refills: 2 | Status: SHIPPED | OUTPATIENT
Start: 2024-12-11

## 2024-12-11 NOTE — TELEPHONE ENCOUNTER
Patient requesting refill on     Requested Prescriptions     Pending Prescriptions Disp Refills    albuterol sulfate HFA (PROVENTIL;VENTOLIN;PROAIR) 108 (90 Base) MCG/ACT inhaler [Pharmacy Med Name: ALBUTEROL HFA INH (200 PUFFS) 8.5GM] 8.5 g      Sig: INHALE 2 PUFFS BY MOUTH EVERY 4 HOURS AS NEEDED FOR WHEEZING OR SHORTNESS OF BREATH        Last OV 12/4/2024

## 2024-12-24 ENCOUNTER — TELEPHONE (OUTPATIENT)
Age: 44
End: 2024-12-24

## 2024-12-24 DIAGNOSIS — B37.31 CANDIDA VAGINITIS: Primary | ICD-10-CM

## 2024-12-24 RX ORDER — FLUCONAZOLE 150 MG/1
150 TABLET ORAL ONCE
Qty: 1 TABLET | Refills: 0 | Status: SHIPPED | OUTPATIENT
Start: 2024-12-24 | End: 2024-12-24

## 2024-12-24 NOTE — TELEPHONE ENCOUNTER
Medication Refill Request    Patricia Babb is requesting a refill of the following medication(s):     Diflucan (knows she has a yeast infection)    (Can lisandro fill this)    Please send refill to:     [id number] - [place], [place] - [address] [phone] - F [phone]  [address] [address]  Phone: [phone] Fax: [phone]

## 2024-12-24 NOTE — TELEPHONE ENCOUNTER
Patient requesting refill on     Requested Prescriptions     Pending Prescriptions Disp Refills    fluconazole (DIFLUCAN) 150 MG tablet [Pharmacy Med Name: FLUCONAZOLE 150MG TABLETS] 2 tablet 0     Sig: TAKE 1 TABLET BY MOUTH EVERY 72 HOURS FOR 6 DAYS        Last OV 10/8/2024

## 2024-12-26 RX ORDER — FLUCONAZOLE 150 MG/1
150 TABLET ORAL
Qty: 2 TABLET | Refills: 0 | OUTPATIENT
Start: 2024-12-26 | End: 2025-01-01

## 2024-12-26 RX ORDER — FLUCONAZOLE 150 MG/1
150 TABLET ORAL ONCE
Qty: 1 TABLET | Refills: 0 | Status: SHIPPED | OUTPATIENT
Start: 2024-12-26 | End: 2024-12-26

## 2025-01-15 RX ORDER — HYDROCHLOROTHIAZIDE 25 MG/1
25 TABLET ORAL DAILY
Qty: 90 TABLET | Refills: 1 | Status: SHIPPED | OUTPATIENT
Start: 2025-01-15

## 2025-01-24 ENCOUNTER — OFFICE VISIT (OUTPATIENT)
Age: 45
End: 2025-01-24
Payer: COMMERCIAL

## 2025-01-24 VITALS
HEART RATE: 63 BPM | DIASTOLIC BLOOD PRESSURE: 81 MMHG | SYSTOLIC BLOOD PRESSURE: 122 MMHG | BODY MASS INDEX: 39.51 KG/M2 | HEIGHT: 63 IN | TEMPERATURE: 97.8 F | WEIGHT: 223 LBS | OXYGEN SATURATION: 98 % | RESPIRATION RATE: 18 BRPM

## 2025-01-24 DIAGNOSIS — Z79.01 LONG TERM CURRENT USE OF ANTICOAGULANT THERAPY: ICD-10-CM

## 2025-01-24 DIAGNOSIS — F41.9 ANXIETY: ICD-10-CM

## 2025-01-24 DIAGNOSIS — J06.9 VIRAL URI: Primary | ICD-10-CM

## 2025-01-24 LAB
EXP DATE SOLUTION: NORMAL
EXP DATE SWAB: NORMAL
EXPIRATION DATE: NORMAL
INFLUENZA A ANTIGEN, POC: NEGATIVE
INFLUENZA B ANTIGEN, POC: NEGATIVE
LOT NUMBER POC: NORMAL
LOT NUMBER SOLUTION: NORMAL
LOT NUMBER SWAB: NORMAL
SARS-COV-2 RNA, POC: NEGATIVE
VALID INTERNAL CONTROL, POC: YES

## 2025-01-24 PROCEDURE — 87635 SARS-COV-2 COVID-19 AMP PRB: CPT | Performed by: STUDENT IN AN ORGANIZED HEALTH CARE EDUCATION/TRAINING PROGRAM

## 2025-01-24 PROCEDURE — 87502 INFLUENZA DNA AMP PROBE: CPT | Performed by: STUDENT IN AN ORGANIZED HEALTH CARE EDUCATION/TRAINING PROGRAM

## 2025-01-24 RX ORDER — DEXLANSOPRAZOLE 60 MG/1
60 CAPSULE, DELAYED RELEASE ORAL DAILY
Qty: 90 CAPSULE | Refills: 2 | Status: SHIPPED | OUTPATIENT
Start: 2025-01-24

## 2025-01-24 RX ORDER — BUSPIRONE HYDROCHLORIDE 10 MG/1
10 TABLET ORAL 2 TIMES DAILY
Qty: 60 TABLET | Refills: 4 | Status: SHIPPED | OUTPATIENT
Start: 2025-01-24 | End: 2025-02-23

## 2025-01-24 SDOH — ECONOMIC STABILITY: FOOD INSECURITY: WITHIN THE PAST 12 MONTHS, YOU WORRIED THAT YOUR FOOD WOULD RUN OUT BEFORE YOU GOT MONEY TO BUY MORE.: NEVER TRUE

## 2025-01-24 SDOH — ECONOMIC STABILITY: FOOD INSECURITY: WITHIN THE PAST 12 MONTHS, THE FOOD YOU BOUGHT JUST DIDN'T LAST AND YOU DIDN'T HAVE MONEY TO GET MORE.: NEVER TRUE

## 2025-01-24 ASSESSMENT — PATIENT HEALTH QUESTIONNAIRE - PHQ9
SUM OF ALL RESPONSES TO PHQ QUESTIONS 1-9: 0
SUM OF ALL RESPONSES TO PHQ9 QUESTIONS 1 & 2: 0
SUM OF ALL RESPONSES TO PHQ QUESTIONS 1-9: 0
SUM OF ALL RESPONSES TO PHQ QUESTIONS 1-9: 0
3. TROUBLE FALLING OR STAYING ASLEEP: NOT AT ALL
5. POOR APPETITE OR OVEREATING: NOT AT ALL
9. THOUGHTS THAT YOU WOULD BE BETTER OFF DEAD, OR OF HURTING YOURSELF: NOT AT ALL
7. TROUBLE CONCENTRATING ON THINGS, SUCH AS READING THE NEWSPAPER OR WATCHING TELEVISION: NOT AT ALL
SUM OF ALL RESPONSES TO PHQ QUESTIONS 1-9: 0
1. LITTLE INTEREST OR PLEASURE IN DOING THINGS: NOT AT ALL
6. FEELING BAD ABOUT YOURSELF - OR THAT YOU ARE A FAILURE OR HAVE LET YOURSELF OR YOUR FAMILY DOWN: NOT AT ALL
4. FEELING TIRED OR HAVING LITTLE ENERGY: NOT AT ALL
2. FEELING DOWN, DEPRESSED OR HOPELESS: NOT AT ALL
10. IF YOU CHECKED OFF ANY PROBLEMS, HOW DIFFICULT HAVE THESE PROBLEMS MADE IT FOR YOU TO DO YOUR WORK, TAKE CARE OF THINGS AT HOME, OR GET ALONG WITH OTHER PEOPLE: NOT DIFFICULT AT ALL
8. MOVING OR SPEAKING SO SLOWLY THAT OTHER PEOPLE COULD HAVE NOTICED. OR THE OPPOSITE, BEING SO FIGETY OR RESTLESS THAT YOU HAVE BEEN MOVING AROUND A LOT MORE THAN USUAL: NOT AT ALL

## 2025-01-24 NOTE — PROGRESS NOTES
cream, Apply topically 2 times daily, Disp: , Rfl:     propranolol (INDERAL LA) 60 MG extended release capsule, Take 1 capsule by mouth daily, Disp: , Rfl:     clonazePAM (KLONOPIN) 0.5 MG tablet, Take 1 tablet by mouth nightly as needed for Anxiety for up to 30 days. Max Daily Amount: 0.5 mg, Disp: 30 tablet, Rfl: 0    Social History     Tobacco Use   Smoking Status Some Days    Current packs/day: 0.00    Average packs/day: 0.3 packs/day for 8.0 years (2.0 ttl pk-yrs)    Types: Cigarettes    Start date: 3/20/2004    Last attempt to quit: 3/20/2012    Years since quittin.8    Passive exposure: Past   Smokeless Tobacco Never       Review of Systems  ROS:  Gen: denies fever, chills, or fatigue  Resp: denies dyspnea, cough, or wheezing  CV: denies chest pain, pressure, or palpitations  GI:: denies abdominal pain, nausea, vomiting, diarrhea, or constipation  Neuro: denies numbness/tingling or dizziness          Objective:     /81 (Site: Right Upper Arm, Position: Sitting, Cuff Size: Large Adult)   Pulse 63   Temp 97.8 °F (36.6 °C) (Temporal)   Resp 18   Ht 1.6 m (5' 3\")   Wt 101.2 kg (223 lb)   SpO2 98%   BMI 39.50 kg/m²   Body mass index is 39.5 kg/m².    Physical Exam   General: Alert and oriented. No acute distress.  Well nourished.  HEENT :  Eyes: Sclera white, conjunctiva clear.   Mouth: Pharynx non-erythematous, without exudate   Neck: Supple with FROM. No thyroid-megaly  Lungs: no increased wob, All lobes clear to auscultation bilaterally   Heart :RRR, no murmur   Extremities: Non-edematous  Abdomen: Soft and non-distended. Bowel sounds active. No tenderness to palpation, no masses.  Neuro: Cranial nerves grossly normal.  Mental status: Cognition, concentration, memory, and speech intact.   Psych: Mood and thought content appropriate for situation. Dressed appropriately and with good hygiene.  Skin: Warm, dry, and intact. No lesions or discoloration visible.      Results for orders placed or

## 2025-01-27 RX ORDER — DEXLANSOPRAZOLE 60 MG/1
60 CAPSULE, DELAYED RELEASE ORAL DAILY
Qty: 90 CAPSULE | Refills: 2 | Status: SHIPPED | OUTPATIENT
Start: 2025-01-27

## 2025-01-27 RX ORDER — APIXABAN 5 MG/1
5 TABLET, FILM COATED ORAL 2 TIMES DAILY
Qty: 60 TABLET | Refills: 3 | Status: SHIPPED | OUTPATIENT
Start: 2025-01-27

## 2025-02-26 ENCOUNTER — TELEPHONE (OUTPATIENT)
Age: 45
End: 2025-02-26

## 2025-02-26 ENCOUNTER — OFFICE VISIT (OUTPATIENT)
Age: 45
End: 2025-02-26
Payer: COMMERCIAL

## 2025-02-26 VITALS
RESPIRATION RATE: 18 BRPM | WEIGHT: 223 LBS | TEMPERATURE: 98.1 F | HEART RATE: 98 BPM | BODY MASS INDEX: 39.51 KG/M2 | HEIGHT: 63 IN | SYSTOLIC BLOOD PRESSURE: 131 MMHG | DIASTOLIC BLOOD PRESSURE: 89 MMHG | OXYGEN SATURATION: 100 %

## 2025-02-26 DIAGNOSIS — J10.1 INFLUENZA A: Primary | ICD-10-CM

## 2025-02-26 DIAGNOSIS — J45.901 MODERATE ASTHMA WITH ACUTE EXACERBATION, UNSPECIFIED WHETHER PERSISTENT: ICD-10-CM

## 2025-02-26 DIAGNOSIS — R05.1 ACUTE COUGH: ICD-10-CM

## 2025-02-26 LAB
INFLUENZA A ANTIGEN, POC: POSITIVE
INFLUENZA B ANTIGEN, POC: NEGATIVE
VALID INTERNAL CONTROL, POC: YES

## 2025-02-26 PROCEDURE — 3079F DIAST BP 80-89 MM HG: CPT | Performed by: STUDENT IN AN ORGANIZED HEALTH CARE EDUCATION/TRAINING PROGRAM

## 2025-02-26 PROCEDURE — 3075F SYST BP GE 130 - 139MM HG: CPT | Performed by: STUDENT IN AN ORGANIZED HEALTH CARE EDUCATION/TRAINING PROGRAM

## 2025-02-26 PROCEDURE — 99214 OFFICE O/P EST MOD 30 MIN: CPT | Performed by: STUDENT IN AN ORGANIZED HEALTH CARE EDUCATION/TRAINING PROGRAM

## 2025-02-26 PROCEDURE — 87502 INFLUENZA DNA AMP PROBE: CPT | Performed by: STUDENT IN AN ORGANIZED HEALTH CARE EDUCATION/TRAINING PROGRAM

## 2025-02-26 RX ORDER — OSELTAMIVIR PHOSPHATE 75 MG/1
75 CAPSULE ORAL 2 TIMES DAILY
Qty: 10 CAPSULE | Refills: 0 | Status: SHIPPED | OUTPATIENT
Start: 2025-02-26 | End: 2025-03-03

## 2025-02-26 RX ORDER — PREDNISONE 20 MG/1
40 TABLET ORAL DAILY
Qty: 10 TABLET | Refills: 0 | Status: SHIPPED | OUTPATIENT
Start: 2025-02-26 | End: 2025-03-03

## 2025-02-26 NOTE — PROGRESS NOTES
Subjective:     Chief Complaint   Patient presents with    Cough    Generalized Body Aches    Wheezing       Patricia Babb is a 45 y.o. female who presents today for follow up     HPI    Upper Respiratory Infection  Patient presents for URI symptoms and wheezing   She has onset of body aches and chills   Cough has been nonproductive  Has had mild wheezing and has been using albuterol every 2-3x daily. Does help wheezing.  This is right before her visit today.  She has had some very mild shortness of breath.  Does improve with her albuterol as well.  Feels that this is an asthma exacerbation similar to what she has had in the past.  No fevers, has had chills    Symptoms onset yesterday   Has not done any Covid or flu testing    diagnosed with flu earlier this week     Patient Active Problem List   Diagnosis    Hives of unknown origin    BMI 45.0-49.9, adult    Hyperuricemia    Essential hypertension    GERD (gastroesophageal reflux disease)    Peptic ulcer disease    Complex ovarian cyst    SIRS (systemic inflammatory response syndrome) (HCC)    Arthritis of right ankle    SAIMA (obstructive sleep apnea)    Obesity, Class III, BMI 40-49.9 (morbid obesity)    Hemorrhoids    Long term current use of anticoagulant therapy    Vitamin D deficiency    Fibromyalgia    History of recurrent deep vein thrombosis (DVT)    Irritable bowel syndrome without diarrhea    Mild intermittent asthma with acute exacerbation    Defibrination syndrome    Pelvic pain    Nontoxic multinodular goiter    Anxiety       Past Medical History:   Diagnosis Date    Asthma     2017 SUMMER LAST FLARE UP    Condyloma acuminatum     Deep vein thrombophlebitis of lower leg, left (HCC) 2016    LT LOWER LEG (2ND EPISODE)    Deep vein thrombosis (DVT) ([facility]) 10/14/2008    lower left leg-DURING PREGNANCY    GERD (gastroesophageal reflux disease)     H/O fibromyalgia     Hyperlipidemia     Hypertension 8/05    CONROLLED BY MEDS    Menarche age 16

## 2025-02-26 NOTE — TELEPHONE ENCOUNTER
Patient has the flu and having issues with her asthma. She's wanting to know if you could send in a round of prednisone to [facility]

## 2025-04-15 ENCOUNTER — OFFICE VISIT (OUTPATIENT)
Age: 45
End: 2025-04-15
Payer: COMMERCIAL

## 2025-04-15 VITALS
OXYGEN SATURATION: 90 % | WEIGHT: 227 LBS | HEIGHT: 63 IN | RESPIRATION RATE: 18 BRPM | HEART RATE: 90 BPM | DIASTOLIC BLOOD PRESSURE: 85 MMHG | SYSTOLIC BLOOD PRESSURE: 127 MMHG | BODY MASS INDEX: 40.22 KG/M2 | TEMPERATURE: 97 F

## 2025-04-15 DIAGNOSIS — L02.422 FURUNCLE OF LEFT AXILLA: Primary | ICD-10-CM

## 2025-04-15 DIAGNOSIS — L73.2 HYDRADENITIS: ICD-10-CM

## 2025-04-15 PROCEDURE — 99214 OFFICE O/P EST MOD 30 MIN: CPT | Performed by: STUDENT IN AN ORGANIZED HEALTH CARE EDUCATION/TRAINING PROGRAM

## 2025-04-15 PROCEDURE — 3079F DIAST BP 80-89 MM HG: CPT | Performed by: STUDENT IN AN ORGANIZED HEALTH CARE EDUCATION/TRAINING PROGRAM

## 2025-04-15 PROCEDURE — 3074F SYST BP LT 130 MM HG: CPT | Performed by: STUDENT IN AN ORGANIZED HEALTH CARE EDUCATION/TRAINING PROGRAM

## 2025-04-15 RX ORDER — DOXYCYCLINE HYCLATE 100 MG
100 TABLET ORAL 2 TIMES DAILY
Qty: 14 TABLET | Refills: 0 | Status: SHIPPED | OUTPATIENT
Start: 2025-04-15 | End: 2025-04-22

## 2025-04-15 RX ORDER — CLINDAMYCIN PHOSPHATE 10 MG/G
GEL TOPICAL
Qty: 60 G | Refills: 1 | Status: SHIPPED | OUTPATIENT
Start: 2025-04-15 | End: 2025-04-22

## 2025-04-15 NOTE — PROGRESS NOTES
Subjective:     Chief Complaint   Patient presents with    Cyst       Patricia Babb is a 45 y.o. female who presents today for follow up     HPI    Patient presents today for what she describes as a cyst under the left armpit.  This has been present for the 3 to 4 days.  The area is very painful.  Warm and red.  She has not had any active drainage.  She reports that she has had multiple similar areas in the armpits and groin.  Previously managed by Dr. Riggs and has not had these drained in the past.  She has tried taking couple days of antibiotics that she had at home, may have been clindamycin?  She has also tried an antibiotic cream.  Not using warm compresses.  Symptoms have not improved.  No fevers or chills.    Patient Active Problem List   Diagnosis    Hives of unknown origin    BMI 45.0-49.9, adult    Hyperuricemia    Essential hypertension    GERD (gastroesophageal reflux disease)    Peptic ulcer disease    Complex ovarian cyst    SIRS (systemic inflammatory response syndrome) (HCC)    Arthritis of right ankle    SAIMA (obstructive sleep apnea)    Obesity, Class III, BMI 40-49.9 (morbid obesity)    Hemorrhoids    Long term current use of anticoagulant therapy    Vitamin D deficiency    Fibromyalgia    History of recurrent deep vein thrombosis (DVT)    Irritable bowel syndrome without diarrhea    Mild intermittent asthma with acute exacerbation    Defibrination syndrome    Pelvic pain    Nontoxic multinodular goiter    Anxiety       Past Medical History:   Diagnosis Date    Asthma     2017 SUMMER LAST FLARE UP    Condyloma acuminatum     Deep vein thrombophlebitis of lower leg, left (HCC) 2016    LT LOWER LEG (2ND EPISODE)    Deep vein thrombosis (DVT) ([facility]) 10/14/2008    lower left leg-DURING PREGNANCY    GERD (gastroesophageal reflux disease)     H/O fibromyalgia     Hyperlipidemia     Hypertension 8/05    CONROLLED BY MEDS    Menarche age 16    Thyroid cyst     2 cysts on each lobe. Dr. Rivera    Thyroid